# Patient Record
Sex: MALE | Race: WHITE
[De-identification: names, ages, dates, MRNs, and addresses within clinical notes are randomized per-mention and may not be internally consistent; named-entity substitution may affect disease eponyms.]

---

## 2017-10-25 ENCOUNTER — HOSPITAL ENCOUNTER (EMERGENCY)
Dept: HOSPITAL 17 - PHED | Age: 37
Discharge: HOME | End: 2017-10-25
Payer: MEDICARE

## 2017-10-25 VITALS — WEIGHT: 156.31 LBS | HEIGHT: 72 IN | BODY MASS INDEX: 21.17 KG/M2

## 2017-10-25 VITALS
SYSTOLIC BLOOD PRESSURE: 120 MMHG | TEMPERATURE: 98.7 F | DIASTOLIC BLOOD PRESSURE: 74 MMHG | RESPIRATION RATE: 18 BRPM | HEART RATE: 80 BPM | OXYGEN SATURATION: 98 %

## 2017-10-25 VITALS — SYSTOLIC BLOOD PRESSURE: 114 MMHG | DIASTOLIC BLOOD PRESSURE: 69 MMHG

## 2017-10-25 DIAGNOSIS — R55: Primary | ICD-10-CM
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
## 2017-10-25 DIAGNOSIS — R51: ICD-10-CM

## 2017-10-25 LAB
BASOPHILS # BLD AUTO: 0 TH/MM3 (ref 0–0.2)
BASOPHILS NFR BLD: 0.4 % (ref 0–2)
BUN SERPL-MCNC: 14 MG/DL (ref 7–18)
CALCIUM SERPL-MCNC: 8.7 MG/DL (ref 8.5–10.1)
CHLORIDE SERPL-SCNC: 106 MEQ/L (ref 98–107)
CREAT SERPL-MCNC: 1.1 MG/DL (ref 0.6–1.3)
EOSINOPHIL # BLD: 0.1 TH/MM3 (ref 0–0.4)
EOSINOPHIL NFR BLD: 1 % (ref 0–4)
ERYTHROCYTE [DISTWIDTH] IN BLOOD BY AUTOMATED COUNT: 11.8 % (ref 11.6–17.2)
GFR SERPLBLD BASED ON 1.73 SQ M-ARVRAT: 76 ML/MIN (ref 89–?)
GLUCOSE SERPL-MCNC: 96 MG/DL (ref 74–106)
HCO3 BLD-SCNC: 28.8 MEQ/L (ref 21–32)
HCT VFR BLD CALC: 40.3 % (ref 39–51)
HGB BLD-MCNC: 13.9 GM/DL (ref 13–17)
LYMPHOCYTES # BLD AUTO: 1.1 TH/MM3 (ref 1–4.8)
LYMPHOCYTES NFR BLD AUTO: 14.7 % (ref 9–44)
MCH RBC QN AUTO: 29.2 PG (ref 27–34)
MCHC RBC AUTO-ENTMCNC: 34.5 % (ref 32–36)
MCV RBC AUTO: 84.8 FL (ref 80–100)
MONOCYTE #: 0.4 TH/MM3 (ref 0–0.9)
MONOCYTES NFR BLD: 4.6 % (ref 0–8)
NEUTROPHILS # BLD AUTO: 6.1 TH/MM3 (ref 1.8–7.7)
NEUTROPHILS NFR BLD AUTO: 79.3 % (ref 16–70)
PLATELET # BLD: 232 TH/MM3 (ref 150–450)
PMV BLD AUTO: 9.1 FL (ref 7–11)
RBC # BLD AUTO: 4.76 MIL/MM3 (ref 4.5–5.9)
SODIUM SERPL-SCNC: 140 MEQ/L (ref 136–145)
WBC # BLD AUTO: 7.7 TH/MM3 (ref 4–11)

## 2017-10-25 PROCEDURE — 99284 EMERGENCY DEPT VISIT MOD MDM: CPT

## 2017-10-25 PROCEDURE — 85025 COMPLETE CBC W/AUTO DIFF WBC: CPT

## 2017-10-25 PROCEDURE — 80048 BASIC METABOLIC PNL TOTAL CA: CPT

## 2017-10-25 PROCEDURE — 93005 ELECTROCARDIOGRAM TRACING: CPT

## 2017-10-26 NOTE — EKG
Date Performed: 10/25/2017       Time Performed: 14:35:33

 

PTAGE:      37 years

 

EKG:      Sinus rhythm 

 

 NORMAL ECG 

 

NO PREVIOUS TRACING            

 

DOCTOR:   Coreen Aguirre  Interpretating Date/Time  10/26/2017 14:42:41

## 2018-04-16 ENCOUNTER — HOSPITAL ENCOUNTER (EMERGENCY)
Dept: HOSPITAL 17 - PHEFT | Age: 38
Discharge: HOME | End: 2018-04-16
Payer: COMMERCIAL

## 2018-04-16 VITALS
SYSTOLIC BLOOD PRESSURE: 153 MMHG | TEMPERATURE: 98.7 F | OXYGEN SATURATION: 99 % | HEART RATE: 80 BPM | RESPIRATION RATE: 16 BRPM | DIASTOLIC BLOOD PRESSURE: 89 MMHG

## 2018-04-16 VITALS — BODY MASS INDEX: 20.31 KG/M2 | WEIGHT: 149.91 LBS | HEIGHT: 72 IN

## 2018-04-16 DIAGNOSIS — K04.7: Primary | ICD-10-CM

## 2018-04-16 PROCEDURE — 99283 EMERGENCY DEPT VISIT LOW MDM: CPT

## 2018-04-16 NOTE — PD
HPI


Chief Complaint:  Facial Pain or Swelling


Time Seen by Provider:  11:59


Travel History


International Travel<30 days:  No


Contact w/Intl Traveler<30days:  No


Traveled to known affect area:  No





History of Present Illness


HPI


38-year-old male presents emergency department for evaluation of right cheek 

swelling that started approximately 2 days ago.  Says that the right cheek 

began swelling and became tender.  He denies any abnormal tastes or significant 

pain.  However, says he does have tenderness with palpation.  Says he has a 

history of a cheek swelling previously that seemed to have resolved on its own.

  Says the swelling is actually gone down since yesterday but decided to come 

in for evaluation as he is not sure if this is an infection.  He denies fevers 

or chills.  Denies medication use.  Mother states patient has good dentition 

and does not normally have dental problems.





PFSH


Past Medical History


Medical History:  Denies Significant Hx


Tetanus Vaccination:  > 5 Years


Influenza Vaccination:  No





Past Surgical History


Surgical History:  No Previous Surgery


Other Surgery:  Yes (right outer ear at age 4 for unfolding of ear that was 

bend from birth)





Social History


Alcohol Use:  No


Tobacco Use:  No


Substance Use:  No





Allergies-Medications


(Allergen,Severity, Reaction):  


Coded Allergies:  


     No Known Allergies (Unverified  Adverse Reaction, Unknown, 4/16/18)


Reported Meds & Prescriptions





Reported Meds & Active Scripts


Active


Penicillin V Potassium 500 Mg Tab 500 Mg PO Q8H 7 Days








Review of Systems


Except as stated in HPI:  all other systems reviewed are Neg





Physical Exam


Narrative


GENERAL: WD, WN in NAD


SKIN: Warm and dry.


HEAD: Atraumatic. Normocephalic. 


EYES: Pupils equal and round. No scleral icterus. No injection or drainage. 


ENT: No nasal bleeding or discharge.  Mucous membranes pink and moist.  Mild 

tenderness palpation to the umbilical aspect of the lower right gingiva near 

the bicuspids.  No obvious exudate.


Right jaw line with a round somewhat mobile mass, no surrounding erythema of 

the cheek


NECK: Trachea midline. No JVD. No lymphadenopathy


CARDIOVASCULAR: Regular rate and rhythm.  


RESPIRATORY: No accessory muscle use. Clear to auscultation. Breath sounds 

equal bilaterally. 


GASTROINTESTINAL: Abdomen soft, non-tender, nondistended. Hepatic and splenic 

margins not palpable. 


MUSCULOSKELETAL: Extremities without clubbing, cyanosis, or edema. No obvious 

deformities. 


NEUROLOGICAL: Awake and alert. No obvious cranial nerve deficits.  Motor 

grossly within normal limits. Five out of 5 muscle strength in the arms and 

legs.  Normal speech.


PSYCHIATRIC: Appropriate mood and affect; insight and judgment normal.





Data


Data


Last Documented VS





Vital Signs








  Date Time  Temp Pulse Resp B/P (MAP) Pulse Ox O2 Delivery O2 Flow Rate FiO2


 


4/16/18 11:39 98.7 80 16 153/89 (110) 99   











MDM


Medical Decision Making


Medical Screen Exam Complete:  Yes


Emergency Medical Condition:  Yes


Differential Diagnosis


Dental abscess, sialitis, tooth infection, cellulitis


Narrative Course


38-year-old male presents emergency department for evaluation of right cheek 

swelling that started approximately 2 days ago.  Says that the right cheek 

began swelling and became tender.  He denies any abnormal tastes or significant 

pain.  However, says he does have tenderness with palpation.  Says he has a 

history of a cheek swelling previously that seemed to have resolved on its own.

  Says the swelling is actually gone down since yesterday but decided to come 

in for evaluation as he is not sure if this is an infection.  He denies fevers 

or chills.  Denies medication use.  Mother states patient has good dentition 

and does not normally have dental problems.


Vital signs are stable.


His exam findings consistent with a dental infection versus sialitis.  Because 

of the uncertainty, will treat for dental infection.  Patient will likely 

require further evaluation by a dentist.


Penicillin will be prescribed for possible infection.


Patient advised to follow-up with his primary care physician.


Consider follow-up with a dentist for further evaluation.





Diagnosis





 Primary Impression:  


 Dental infection


Referrals:  


Primary Care Physician





***Additional Instructions:  


Take all medications as prescribed.


Follow-up with the primary care physician this week for further evaluation.


Follow-up with a dentist for further evaluation.


If your symptoms worsen or persist return to the emergency department.


Scripts


Penicillin V Potassium (Penicillin V Potassium) 500 Mg Tab


500 MG PO Q8H for Infection for 7 Days, #21 TAB 0 Refills


   Prov: Anisha Angeles DO         4/16/18


Disposition:  01 DISCHARGE HOME


Condition:  Stable











Melida Christine Apr 16, 2018 12:21

## 2019-04-08 NOTE — PD
HPI


Chief Complaint:  Syncope/Near-Syncope


Time Seen by Provider:  14:22


Travel History


International Travel<30 days:  No


Contact w/Intl Traveler<30days:  No


Traveled to known affect area:  No





History of Present Illness


HPI


This 37-year-old male apparently fainted at the dentist office.  He says he was 

done with his appointment.  Only been there for cleaning.  He said he was 

standing at the desk when he felt that he had to sit down.  He could not find a 

seatbelt in a parent had a syncopal episode and fell backwards.  He has some 

pain in the back of his head which is resolving.  He thinks he has passed out 

once before many years ago.  He denies any medications.





PFSH


Past Medical History


Medical other:  Yes (hx of syncope)


Tetanus Vaccination:  > 5 Years


Influenza Vaccination:  No





Past Surgical History


Other Surgery:  Yes (right outer ear at age 4 for unfolding of ear that was 

bend from birth)





Social History


Alcohol Use:  No


Tobacco Use:  No


Substance Use:  No





Allergies-Medications


(Allergen,Severity, Reaction):  


Coded Allergies:  


     No Known Allergies (Unverified , 10/25/17)


Reported Meds & Prescriptions





Reported Meds & Active Scripts


Active


No Active Prescriptions or Reported Medications    








Review of Systems


General / Constitutional:  No: Fever, Chills


Eyes:  No: Diploplia, Blurred Vision


HENT:  No: Headaches, Vertigo


Cardiovascular:  No: Chest Pain or Discomfort, Palpitations


Respiratory:  No: Cough, Shortness of Breath


Gastrointestinal:  No: Vomiting, Diarrhea


Genitourinary:  No: Urgency, Frequency


Musculoskeletal:  No: Myalgias, Arthralgias


Skin:  No Rash


Neurologic:  No: Weakness, Dizziness


Psychiatric:  No: Anxiety


Hematologic/Lymphatic:  No: Easy Bruising





Physical Exam


Narrative


GENERAL: Well-developed male


SKIN: Focused skin assessment warm/dry.


HEAD: Atraumatic. Normocephalic. 


EYES: Pupils equal and round. No scleral icterus. No injection or drainage. 


ENT: No nasal bleeding or discharge.  Mucous membranes pink and moist.


NECK: Trachea midline. No JVD. 


CARDIOVASCULAR: Regular rate and rhythm.  No murmur appreciated.


RESPIRATORY: No accessory muscle use. Clear to auscultation. Breath sounds 

equal bilaterally. 


GASTROINTESTINAL: Abdomen soft, non-tender, nondistended. Hepatic and splenic 

margins not palpable. 


MUSCULOSKELETAL: No obvious deformities. No clubbing.  No cyanosis.  No edema. 


NEUROLOGICAL: Awake and alert. No obvious cranial nerve deficits.  Motor 

grossly within normal limits. Normal speech.


PSYCHIATRIC: Appropriate mood and affect; insight and judgment normal.





Data


Data


Last Documented VS





Vital Signs








  Date Time  Temp Pulse Resp B/P (MAP) Pulse Ox O2 Delivery O2 Flow Rate FiO2


 


10/25/17 14:35  78 16  98 Room Air  


 


10/25/17 13:33 98.7   120/74 (89)    








Orders





 Orders


Electrocardiogram (10/25/17 14:29)


Complete Blood Count With Diff (10/25/17 14:29)


Basic Metabolic Panel (Bmp) (10/25/17 14:29)





Labs





Laboratory Tests








Test


  10/25/17


14:50


 


White Blood Count 7.7 TH/MM3 


 


Red Blood Count 4.76 MIL/MM3 


 


Hemoglobin 13.9 GM/DL 


 


Hematocrit 40.3 % 


 


Mean Corpuscular Volume 84.8 FL 


 


Mean Corpuscular Hemoglobin 29.2 PG 


 


Mean Corpuscular Hemoglobin


Concent 34.5 % 


 


 


Red Cell Distribution Width 11.8 % 


 


Platelet Count 232 TH/MM3 


 


Mean Platelet Volume 9.1 FL 


 


Neutrophils (%) (Auto) 79.3 % 


 


Lymphocytes (%) (Auto) 14.7 % 


 


Monocytes (%) (Auto) 4.6 % 


 


Eosinophils (%) (Auto) 1.0 % 


 


Basophils (%) (Auto) 0.4 % 


 


Neutrophils # (Auto) 6.1 TH/MM3 


 


Lymphocytes # (Auto) 1.1 TH/MM3 


 


Monocytes # (Auto) 0.4 TH/MM3 


 


Eosinophils # (Auto) 0.1 TH/MM3 


 


Basophils # (Auto) 0.0 TH/MM3 


 


CBC Comment DIFF FINAL 


 


Differential Comment  


 


Blood Urea Nitrogen 14 MG/DL 


 


Creatinine 1.10 MG/DL 


 


Random Glucose 96 MG/DL 


 


Calcium Level 8.7 MG/DL 


 


Sodium Level 140 MEQ/L 


 


Potassium Level 4.5 MEQ/L 


 


Chloride Level 106 MEQ/L 


 


Carbon Dioxide Level 28.8 MEQ/L 


 


Anion Gap 5 MEQ/L 


 


Estimat Glomerular Filtration


Rate 76 ML/MIN 


 











[facility]


Medical Decision Making


Medical Screen Exam Complete:  Yes


Emergency Medical Condition:  Yes


Medical Record Reviewed:  Yes


Differential Diagnosis


Differential includes vasovagal syncope, dysrhythmia, anemia


Narrative Course


Work is normal.  EKG shows normal sinus rhythm.  This gentleman did have a 

warning that he was going to pass out and then he subsequently passed out.  

This is most consistent with vasovagal syncope.  Patient is stable for discharge





Diagnosis





 Primary Impression:  


 Vasovagal syncope





***Additional Instructions:  


Drink plenty of fluids, follow-up with your own medical doctor


Scripts


No Active Prescriptions or Reported Meds


Disposition:  01 DISCHARGE HOME


Condition:  Stable











Tony Nicole MD Oct 25, 2017 15:15
HPI


Chief Complaint:  Syncope/Near-Syncope


Time Seen by Provider:  14:22


Travel History


International Travel<30 days:  No


Contact w/Intl Traveler<30days:  No


Traveled to known affect area:  No





History of Present Illness


HPI


This 37-year-old male apparently fainted at the dentist office.  He says he was 

done with his appointment.  Only been there for cleaning.  He said he was 

standing at the desk when he felt that he had to sit down.  He could not find a 

seatbelt in a parent had a syncopal episode and fell backwards.  He has some 

pain in the back of his head which is resolving.  He thinks he has passed out 

once before many years ago.  He denies any medications.





PFSH


Past Medical History


Medical other:  Yes (hx of syncope)


Tetanus Vaccination:  > 5 Years


Influenza Vaccination:  No





Past Surgical History


Other Surgery:  Yes (right outer ear at age 4 for unfolding of ear that was 

bend from birth)





Social History


Alcohol Use:  No


Tobacco Use:  No


Substance Use:  No





Allergies-Medications


(Allergen,Severity, Reaction):  


Coded Allergies:  


     No Known Allergies (Unverified , 10/25/17)


Reported Meds & Prescriptions





Reported Meds & Active Scripts


Active


No Active Prescriptions or Reported Medications    








Review of Systems


General / Constitutional:  No: Fever, Chills


Eyes:  No: Diploplia, Blurred Vision


HENT:  No: Headaches, Vertigo


Cardiovascular:  No: Chest Pain or Discomfort, Palpitations


Respiratory:  No: Cough, Shortness of Breath


Gastrointestinal:  No: Vomiting, Diarrhea


Genitourinary:  No: Urgency, Frequency


Musculoskeletal:  No: Myalgias, Arthralgias


Skin:  No Rash


Neurologic:  No: Weakness, Dizziness


Psychiatric:  No: Anxiety


Hematologic/Lymphatic:  No: Easy Bruising





Physical Exam


Narrative


GENERAL: Well-developed male


SKIN: Focused skin assessment warm/dry.


HEAD: Atraumatic. Normocephalic. 


EYES: Pupils equal and round. No scleral icterus. No injection or drainage. 


ENT: No nasal bleeding or discharge.  Mucous membranes pink and moist.


NECK: Trachea midline. No JVD. 


CARDIOVASCULAR: Regular rate and rhythm.  No murmur appreciated.


RESPIRATORY: No accessory muscle use. Clear to auscultation. Breath sounds 

equal bilaterally. 


GASTROINTESTINAL: Abdomen soft, non-tender, nondistended. Hepatic and splenic 

margins not palpable. 


MUSCULOSKELETAL: No obvious deformities. No clubbing.  No cyanosis.  No edema. 


NEUROLOGICAL: Awake and alert. No obvious cranial nerve deficits.  Motor 

grossly within normal limits. Normal speech.


PSYCHIATRIC: Appropriate mood and affect; insight and judgment normal.





Data


Data


Last Documented VS





Vital Signs








  Date Time  Temp Pulse Resp B/P (MAP) Pulse Ox O2 Delivery O2 Flow Rate FiO2


 


10/25/17 14:35  78 16  98 Room Air  


 


10/25/17 13:33 98.7   120/74 (89)    








Orders





 Orders


Electrocardiogram (10/25/17 14:29)


Complete Blood Count With Diff (10/25/17 14:29)


Basic Metabolic Panel (Bmp) (10/25/17 14:29)





Labs





Laboratory Tests








Test


  10/25/17


14:50


 


White Blood Count 7.7 TH/MM3 


 


Red Blood Count 4.76 MIL/MM3 


 


Hemoglobin 13.9 GM/DL 


 


Hematocrit 40.3 % 


 


Mean Corpuscular Volume 84.8 FL 


 


Mean Corpuscular Hemoglobin 29.2 PG 


 


Mean Corpuscular Hemoglobin


Concent 34.5 % 


 


 


Red Cell Distribution Width 11.8 % 


 


Platelet Count 232 TH/MM3 


 


Mean Platelet Volume 9.1 FL 


 


Neutrophils (%) (Auto) 79.3 % 


 


Lymphocytes (%) (Auto) 14.7 % 


 


Monocytes (%) (Auto) 4.6 % 


 


Eosinophils (%) (Auto) 1.0 % 


 


Basophils (%) (Auto) 0.4 % 


 


Neutrophils # (Auto) 6.1 TH/MM3 


 


Lymphocytes # (Auto) 1.1 TH/MM3 


 


Monocytes # (Auto) 0.4 TH/MM3 


 


Eosinophils # (Auto) 0.1 TH/MM3 


 


Basophils # (Auto) 0.0 TH/MM3 


 


CBC Comment DIFF FINAL 


 


Differential Comment  


 


Blood Urea Nitrogen 14 MG/DL 


 


Creatinine 1.10 MG/DL 


 


Random Glucose 96 MG/DL 


 


Calcium Level 8.7 MG/DL 


 


Sodium Level 140 MEQ/L 


 


Potassium Level 4.5 MEQ/L 


 


Chloride Level 106 MEQ/L 


 


Carbon Dioxide Level 28.8 MEQ/L 


 


Anion Gap 5 MEQ/L 


 


Estimat Glomerular Filtration


Rate 76 ML/MIN 


 











[facility]


Medical Decision Making


Medical Screen Exam Complete:  Yes


Emergency Medical Condition:  Yes


Medical Record Reviewed:  Yes


Differential Diagnosis


Differential includes vasovagal syncope, dysrhythmia, anemia


Narrative Course


Work is normal.  EKG shows normal sinus rhythm.  This gentleman did have a 

warning that he was going to pass out and then he subsequently passed out.  

This is most consistent with vasovagal syncope.  Patient is stable for discharge





Diagnosis





 Primary Impression:  


 Vasovagal syncope





***Additional Instructions:  


Drink plenty of fluids, follow-up with your own medical doctor


Scripts


No Active Prescriptions or Reported Meds


Disposition:  01 DISCHARGE HOME


Condition:  Stable











Tony Nicole MD Oct 25, 2017 15:15
HPI


Chief Complaint:  Syncope/Near-Syncope


Time Seen by Provider:  14:22


Travel History


International Travel<30 days:  No


Contact w/Intl Traveler<30days:  No


Traveled to known affect area:  No





History of Present Illness


HPI


This 37-year-old male apparently fainted at the dentist office.  He says he was 

done with his appointment.  Only been there for cleaning.  He said he was 

standing at the desk when he felt that he had to sit down.  He could not find a 

seatbelt in a parent had a syncopal episode and fell backwards.  He has some 

pain in the back of his head which is resolving.  He thinks he has passed out 

once before many years ago.  He denies any medications.





PFSH


Past Medical History


Medical other:  Yes (hx of syncope)


Tetanus Vaccination:  > 5 Years


Influenza Vaccination:  No





Past Surgical History


Other Surgery:  Yes (right outer ear at age 4 for unfolding of ear that was 

bend from birth)





Social History


Alcohol Use:  No


Tobacco Use:  No


Substance Use:  No





Allergies-Medications


(Allergen,Severity, Reaction):  


Coded Allergies:  


     No Known Allergies (Unverified , 10/25/17)


Reported Meds & Prescriptions





Reported Meds & Active Scripts


Active


No Active Prescriptions or Reported Medications    








Review of Systems


General / Constitutional:  No: Fever, Chills


Eyes:  No: Diploplia, Blurred Vision


HENT:  No: Headaches, Vertigo


Cardiovascular:  No: Chest Pain or Discomfort, Palpitations


Respiratory:  No: Cough, Shortness of Breath


Gastrointestinal:  No: Vomiting, Diarrhea


Genitourinary:  No: Urgency, Frequency


Musculoskeletal:  No: Myalgias, Arthralgias


Skin:  No Rash


Neurologic:  No: Weakness, Dizziness


Psychiatric:  No: Anxiety


Hematologic/Lymphatic:  No: Easy Bruising





Physical Exam


Narrative


GENERAL: Well-developed male


SKIN: Focused skin assessment warm/dry.


HEAD: Atraumatic. Normocephalic. 


EYES: Pupils equal and round. No scleral icterus. No injection or drainage. 


ENT: No nasal bleeding or discharge.  Mucous membranes pink and moist.


NECK: Trachea midline. No JVD. 


CARDIOVASCULAR: Regular rate and rhythm.  No murmur appreciated.


RESPIRATORY: No accessory muscle use. Clear to auscultation. Breath sounds 

equal bilaterally. 


GASTROINTESTINAL: Abdomen soft, non-tender, nondistended. Hepatic and splenic 

margins not palpable. 


MUSCULOSKELETAL: No obvious deformities. No clubbing.  No cyanosis.  No edema. 


NEUROLOGICAL: Awake and alert. No obvious cranial nerve deficits.  Motor 

grossly within normal limits. Normal speech.


PSYCHIATRIC: Appropriate mood and affect; insight and judgment normal.





Data


Data


Last Documented VS





Vital Signs








  Date Time  Temp Pulse Resp B/P (MAP) Pulse Ox O2 Delivery O2 Flow Rate FiO2


 


10/25/17 14:35  78 16  98 Room Air  


 


10/25/17 13:33 98.7   120/74 (89)    








Orders





 Orders


Electrocardiogram (10/25/17 14:29)


Complete Blood Count With Diff (10/25/17 14:29)


Basic Metabolic Panel (Bmp) (10/25/17 14:29)





Labs





Laboratory Tests








Test


  10/25/17


14:50


 


White Blood Count 7.7 TH/MM3 


 


Red Blood Count 4.76 MIL/MM3 


 


Hemoglobin 13.9 GM/DL 


 


Hematocrit 40.3 % 


 


Mean Corpuscular Volume 84.8 FL 


 


Mean Corpuscular Hemoglobin 29.2 PG 


 


Mean Corpuscular Hemoglobin


Concent 34.5 % 


 


 


Red Cell Distribution Width 11.8 % 


 


Platelet Count 232 TH/MM3 


 


Mean Platelet Volume 9.1 FL 


 


Neutrophils (%) (Auto) 79.3 % 


 


Lymphocytes (%) (Auto) 14.7 % 


 


Monocytes (%) (Auto) 4.6 % 


 


Eosinophils (%) (Auto) 1.0 % 


 


Basophils (%) (Auto) 0.4 % 


 


Neutrophils # (Auto) 6.1 TH/MM3 


 


Lymphocytes # (Auto) 1.1 TH/MM3 


 


Monocytes # (Auto) 0.4 TH/MM3 


 


Eosinophils # (Auto) 0.1 TH/MM3 


 


Basophils # (Auto) 0.0 TH/MM3 


 


CBC Comment DIFF FINAL 


 


Differential Comment  


 


Blood Urea Nitrogen 14 MG/DL 


 


Creatinine 1.10 MG/DL 


 


Random Glucose 96 MG/DL 


 


Calcium Level 8.7 MG/DL 


 


Sodium Level 140 MEQ/L 


 


Potassium Level 4.5 MEQ/L 


 


Chloride Level 106 MEQ/L 


 


Carbon Dioxide Level 28.8 MEQ/L 


 


Anion Gap 5 MEQ/L 


 


Estimat Glomerular Filtration


Rate 76 ML/MIN 


 











[facility]


Medical Decision Making


Medical Screen Exam Complete:  Yes


Emergency Medical Condition:  Yes


Medical Record Reviewed:  Yes


Differential Diagnosis


Differential includes vasovagal syncope, dysrhythmia, anemia


Narrative Course


Work is normal.  EKG shows normal sinus rhythm.  This gentleman did have a 

warning that he was going to pass out and then he subsequently passed out.  

This is most consistent with vasovagal syncope.  Patient is stable for discharge





Diagnosis





 Primary Impression:  


 Vasovagal syncope





***Additional Instructions:  


Drink plenty of fluids, follow-up with your own medical doctor


Scripts


No Active Prescriptions or Reported Meds


Disposition:  01 DISCHARGE HOME


Condition:  Stable











Tony Nicole MD Oct 25, 2017 15:15
Eyes with no visual disturbances.  Ears clean and dry and no hearing difficulties. Nose with pink mucosa and no drainage.  Mouth mucous membranes moist and pink.  No tenderness or swelling to throat or neck.